# Patient Record
Sex: FEMALE | Race: WHITE | Employment: OTHER | URBAN - METROPOLITAN AREA
[De-identification: names, ages, dates, MRNs, and addresses within clinical notes are randomized per-mention and may not be internally consistent; named-entity substitution may affect disease eponyms.]

---

## 2017-11-01 ENCOUNTER — HOSPITAL ENCOUNTER (OUTPATIENT)
Dept: PHYSICAL THERAPY | Age: 72
Discharge: HOME | End: 2017-11-01
Payer: MEDICARE

## 2017-11-01 PROCEDURE — G8979 MOBILITY GOAL STATUS: HCPCS

## 2017-11-01 PROCEDURE — 97110 THERAPEUTIC EXERCISES: CPT

## 2017-11-01 PROCEDURE — 97162 PT EVAL MOD COMPLEX 30 MIN: CPT

## 2017-11-01 PROCEDURE — G8978 MOBILITY CURRENT STATUS: HCPCS

## 2017-11-01 NOTE — PROGRESS NOTES
[facility] PHYSICAL THERAPY AT [facility] [address]  Phone: [phone]  Fax: [phone] / [address] [address] PHYSICAL THERAPY SERVICES  Patient Name: Tomi Joseph : 1945   Treatment Diagnosis: Vertigo [R42] Onset Date: 17   Referral Source: Holly Tripathi MD Start of Care [facility]): 2017   Prior Hospitalization: n/a Provider #: [id number]   Prior Level of Function: Independent all ADL's   Comorbidities: R TKR (17), Osteoporosis, Arthritis, latex allergy   The Plan of Care and following information is based on the information from the initial evaluation.   ===========================================================================================  Assessment / key information:  Patient is 80 y.o. yo female who presents to [facility] with diagnosis of Vertigo [R42]. Patient reports she had a TKR on 17 when she fell and hit her head 2 days later. 10 days later on 10/9/17 the vertigo bagean. Patient c/o the room spinning with nausea but no vomiting, primarily with turning her head quickly to the left and lying flat. Patient reports she has increased symptoms when lying on her left side. Upon objective evaluation patient presents with increased symptoms specifically with supine to sit and sit to supine transitions, mild symptoms with cervical rotation left> R. No nystagmus noted with positional changes. .  Patient scored 64/100 on DHI indicating severe handicap. Positive symptom production with left Eply maneuver. Patient can benefit from PT interventions to decrease r/o fall, improve safety with ADLs, & decrease sx with ADLs & to improve overall functional status.     Eval Complexity: History HIGH Complexity :3+ comorbidities / personal factors will impact the outcome/ POC ;  Examination  HIGH Complexity : 4+ Standardized tests and measures addressing body structure, function, activity limitation and / or participation in recreation ; Presentation MEDIUM Complexity : Evolving with changing characteristics ; Decision Making Other outcome measures Dizziness Handicap 64/100  HIGH ; Overall Complexity MEDIUM    Problem List: decrease strength, impaired gait/ balance, decrease ADL/ functional abilitiies, decrease activity tolerance, decrease flexibility/ joint mobility and decrease transfer abilities   Treatment Plan may include any combination of the following: Therapeutic exercise, Therapeutic activities, Neuromuscular re-education, Physical agent/modality, Gait/balance training, Manual therapy and Patient education  Patient / Family readiness to learn indicated by: asking questions, trying to perform skills and interest  Persons(s) to be included in education: patient (P)  Barriers to Learning/Limitations: None  Measures taken:    Patient Goal (s): \"Get rid of vertigo\"   Patient self reported health status: good  Rehabilitation Potential: good   Short Term Goals: To be accomplished in  1  Week:  1) Patient to tolerate vestibular adaptation exercises tid. 2)Patient will report no symptoms of vertigo for at least 2 consecutive days     Long Term Goals: To be accomplished in  4 Weeks:  1) Patient to decrease score on Dizziness handicap by 34 points indicating improved safety and functional independence  2) Patient will demonstrate no vertigo with VOR exercises thus improving safety with ADL's  3) Patient to be independent with self treatment. Frequency / Duration:   Patient to be seen 1  times per week for 4 weeks:  Patient / Caregiver education and instruction: self care, activity modification and exercises  G-Codes (GP): Mobility [id number] Current  CL= 60-79%   Goal  CJ= 20-39%.   The severity rating is based on the Other [facility] AND SURGICAL [facility]    Therapist Signature: Mark Koo PT Date: 36/8/7266   Certification Period: 11/1/17 to 1/30/18 Time: 8:32 AM ===========================================================================================  I certify that the above Physical Therapy Services are being furnished while the patient is under my care. I agree with the treatment plan and certify that this therapy is necessary. Physician Signature:        Date:       Time:     Please sign and return to [facility] or you may fax the signed copy to [phone]. Thank you.

## 2017-11-01 NOTE — PROGRESS NOTES
PHYSICAL THERAPY - DAILY TREATMENT NOTE    Patient Name: Whit Moreno        Date: 2017  : 1945   YES Patient  Verified  Visit #:      12  Insurance: Payor: Akua Bowser / Plan: [place] & [place] / Product Type: Medicare /      In time: 1100 Out time: 1200   Total Treatment Time: 60     Medicare Time Tracking (below)   Total Timed Codes (min):  60 1:1 Treatment Time:  60     TREATMENT AREA =  Vertigo [R42]  SUBJECTIVE  Pain Level (on 0 to 10 scale):  0  / 10   Medication Changes/New allergies or changes in medical history, any new surgeries or procedures?     NO    If yes, update Summary List   Subjective Functional Status/Changes:  []  No changes reported     See POC          OBJECTIVE  Modalities Rationale:        min [] Estim, type/location:                                      []  att     []  unatt     []  w/US     []  w/ice    []  w/heat    min []  Mechanical Traction: type/lbs                   []  pro   []  sup   []  int   []  cont    []  before manual    []  after manual    min []  Ultrasound, settings/location:      min []  Iontophoresis w/ dexamethasone, location:                                               []  take home patch       []  in clinic    min []  Ice     []  Heat    location/position:     min []  Vasopneumatic Device, press/temp:     min []  Other:    [] Skin assessment post-treatment (if applicable):    []  intact    []  redness- no adverse reaction     []redness  adverse reaction:        30 min Therapeutic Exercise:  [x]  See flow sheet   Rationale:       decrease vertigo to improve the patients ability to perform ADL's      min Manual Therapy:         min Therapeutic Activity:    Rationale:        min Neuromuscular Re-ed:    Rationale:       min Gait Training:    Rationale:       min Patient Education:  YES  Reviewed HEP   [x]  Progressed/Changed HEP based on:   Issued written HEP and reviewed     Other Objective/Functional Measures:    Performed Epley maneuver for left side followed by 5 reps of harrington-daroff exercise   Post Treatment Pain Level (on 0 to 10) scale:   0  / 10     ASSESSMENT  Assessment/Changes in Function:     See POC    Reduction of nausea and vertigo following treatment session       []  See Progress Note/Recertification   Patient will continue to benefit from skilled PT services to modify and progress therapeutic interventions, address functional mobility deficits, address ROM deficits, address strength deficits, analyze and address soft tissue restrictions, analyze and cue movement patterns, analyze and modify body mechanics/ergonomics and assess and modify postural abnormalities to attain remaining goals.    Progress toward goals / Updated goals:    See POC     PLAN  [x]  Upgrade activities as tolerated YES Continue plan of care   []  Discharge due to :    []  Other:      Therapist: Marialuisa Tucker PT    Date: 11/1/2017 Time: 8:31 AM     Future Appointments  Date Time Provider Marita Campbell   11/1/2017 11:00 AM Marialuisa Tucker PT REHAB CENTER AT [facility]

## 2017-11-08 ENCOUNTER — HOSPITAL ENCOUNTER (OUTPATIENT)
Dept: PHYSICAL THERAPY | Age: 72
Discharge: HOME | End: 2017-11-08
Payer: MEDICARE

## 2017-11-08 PROCEDURE — 97112 NEUROMUSCULAR REEDUCATION: CPT

## 2017-11-08 NOTE — PROGRESS NOTES
PHYSICAL THERAPY - DAILY TREATMENT NOTE    Patient Name: Rita Henderson        Date: 2017  : 1945   YES Patient  Verified  Visit #:   1   of   4  Insurance: Payor: Oneil Dear / Plan: [place] MEDICARE PART A & B / Product Type: Medicare /      In time: 1100 Out time: 1150   Total Treatment Time: 50     Medicare Time Tracking (below)   Total Timed Codes (min):  50 1:1 Treatment Time:  50     TREATMENT AREA =  Other abnormalities of gait and mobility [R26.89]  Vertigo [R42]    SUBJECTIVE  Pain Level (on 0 to 10 scale):  0  / 10   Medication Changes/New allergies or changes in medical history, any new surgeries or procedures? NO    If yes, update Summary List   Subjective Functional Status/Changes:  []  No changes reported     Patient reports significant improvement over the past week. \"I was really nauseated and had increased vertigo for three days following the last treatment session but then it improved significantly. Some days I didn't even feel dizzy.   My nausea is at about a 1 right now (compared to 3 last week)\"          OBJECTIVE  Modalities Rationale:      min [] Estim, type/location:                                      []  att     []  unatt     []  w/US     []  w/ice    []  w/heat    min []  Mechanical Traction: type/lbs                   []  pro   []  sup   []  int   []  cont    []  before manual    []  after manual    min []  Ultrasound, settings/location:      min []  Iontophoresis w/ dexamethasone, location:                                               []  take home patch       []  in clinic    min []  Ice     []  Heat    location/position:     min []  Vasopneumatic Device, press/temp:     min []  Other:    [] Skin assessment post-treatment (if applicable):    []  intact    []  redness- no adverse reaction     []redness  adverse reaction:         min Therapeutic Exercise:  [x]  See flow sheet        min Manual Therapy:         min Therapeutic Activity:        50 min Neuromuscular Re-ed: Eply maneuver x1, see  FS for new exercises   Rationale:    improve balance and decrease vertigo to improve the patients ability to perform all ADL's without LOB or nausea     min Gait Training:    Rationale:       min Patient Education:  Marixa Hull   []  Progressed/Changed HEP based on: Other Objective/Functional Measures: Added VSE exercises to HEP and FS     Post Treatment Pain Level (on 0 to 10) scale:   0  / 10     ASSESSMENT  Assessment/Changes in Function:     Patient continued to have mild reproduction of vertigo with L s/l to sit minimal recovery time required and vanishing by the 5th repetition. Eply maneuver produced symptoms then improved vestibular response noted with harrington-dorff ex following maneuver. Patient had slight increase in nausea with VSE horizontal which improved/resolved with VSE vertical.  Nausea following treatment session decreased to < 1.     []  See Progress Note/Recertification   Patient will continue to benefit from skilled PT services to modify and progress therapeutic interventions, address functional mobility deficits, analyze and cue movement patterns, analyze and modify body mechanics/ergonomics, assess and modify postural abnormalities and address imbalance/dizziness to attain remaining goals. Progress toward goals / Updated goals:    STG #1 and LTG #1 met.   Progressing well towards all other goals     PLAN  [x]  Upgrade activities as tolerated YES Continue plan of care   []  Discharge due to :    []  Other:      Therapist: Vero Stover PT    Date: 11/8/2017 Time: 8:50 AM     Future Appointments  Date Time Provider Marita Campbell   11/8/2017 11:00 AM Vero Stover, PT REHAB CENTER AT [facility]

## 2017-11-15 ENCOUNTER — APPOINTMENT (OUTPATIENT)
Dept: PHYSICAL THERAPY | Age: 72
End: 2017-11-15
Payer: MEDICARE

## 2017-11-16 ENCOUNTER — HOSPITAL ENCOUNTER (OUTPATIENT)
Dept: PHYSICAL THERAPY | Age: 72
Discharge: HOME | End: 2017-11-16
Payer: MEDICARE

## 2017-11-16 PROCEDURE — G8979 MOBILITY GOAL STATUS: HCPCS

## 2017-11-16 PROCEDURE — G8980 MOBILITY D/C STATUS: HCPCS

## 2017-11-16 PROCEDURE — 97112 NEUROMUSCULAR REEDUCATION: CPT

## 2017-11-16 PROCEDURE — 95992 CANALITH REPOSITIONING PROC: CPT

## 2017-11-16 NOTE — PROGRESS NOTES
PHYSICAL THERAPY - DAILY TREATMENT NOTE    Patient Name: Michelle Hayes        Date: 2017  : 1945   YES Patient  Verified  Visit #:   2   of   4  Insurance: Payor: [address] [place] [address] / Plan: [place] MEDICARE PART A & B / Product Type: Medicare /      In time: 800 Out time: 039   Total Treatment Time: 30     Medicare Time Tracking (below)   Total Timed Codes (min):  25 1:1 Treatment Time:  25     TREATMENT AREA = Other abnormalities of gait and mobility [R26.89]  Vertigo [R42]    SUBJECTIVE  Pain Level (on 0 to 10 scale):  0  / 10   Medication Changes/New allergies or changes in medical history, any new surgeries or procedures? NO    If yes, update Summary List   Subjective Functional Status/Changes:  []  No changes reported     Got episode of dizziness last Friday while driving. Room spinning and then head pulsating       OBJECTIVE        15 min Manual Therapy: Technique:      [] STM[]IASTM[]TPR[]PROM[] Stretching  [] SOR[] man traction[x] L Epley x2 (CRM)  []OP with REIL  []Jt manipulation []Gr I [] II []  III [] IV[] V[]    Treatment Area:  vestib   Rationale:      dec dizziness to improve patient's ability to perform ADLs    10 min Neuromuscular Re-ed: []  Seebelow- vestibular testing   Rationale:    dec dizziness to improve the patients ability to perform ADLs          throughout Rx min Patient Education:  YES  Reviewed HEP   []  Progressed/Changed HEP based on: Other Objective/Functional Measures:    Modified [place]- Hallpike : positive, bilat L>R, neg L after repositioning     Post Treatment Pain Level (on 0 to 10) scale:   0  / 10     ASSESSMENT  Assessment/Changes in Function:     Functional improvement:  Less dizziness with turning head        []  See Progress Note/Recertification   Patient will continue to benefit from skilled PT services to modify and progress therapeutic interventions and address imbalance/dizziness to attain remaining goals.    Progress toward goals / Updated goals:    Patient reporting significant improvement in Sx     PLAN  []  Upgrade activities as tolerated YES Continue plan of care   []  Discharge due to :    []  Other:      Therapist: Carole Ahumada, PT    Date: 11/16/2017 Time: 8:04 AM     No future appointments.

## 2017-11-30 ENCOUNTER — HOSPITAL ENCOUNTER (OUTPATIENT)
Dept: PHYSICAL THERAPY | Age: 72
Discharge: HOME | End: 2017-11-30
Payer: MEDICARE

## 2017-11-30 PROCEDURE — 95992 CANALITH REPOSITIONING PROC: CPT

## 2017-11-30 PROCEDURE — 97112 NEUROMUSCULAR REEDUCATION: CPT

## 2017-11-30 NOTE — PROGRESS NOTES
PHYSICAL THERAPY - DAILY TREATMENT NOTE    Patient Name: Gloria Barry        Date: 2017  : 1945   YES Patient  Verified  Visit #:   3   of   4  Insurance: Payor: Meredith Hartman / Plan: [place] MEDICARE PART A & B / Product Type: Medicare /      In time: 36 Out time: 45   Total Treatment Time: 25     Medicare Time Tracking (below)   Total Timed Codes (min):  25 1:1 Treatment Time:  25     TREATMENT AREA = Other abnormalities of gait and mobility [R26.89]  Vertigo [R42]    SUBJECTIVE  Pain Level (on 0 to 10 scale):  0  / 10   Medication Changes/New allergies or changes in medical history, any new surgeries or procedures? NO    If yes, update Summary List   Subjective Functional Status/Changes:  []  No changes reported     Did well after last visit. Minor symptoms until recently. Now with minor dizziness and nausea. Overall- 90-95% better     OBJECTIVE        15 min Manual Therapy: Technique:      [] STM[]IASTM[]TPR[]PROM[] Stretching  [] SOR[] man traction[x] CRMx2  []OP with REIL  []Jt manipulation []Gr I [] II []  III [] IV[] V[]    Treatment Area:  vestibular   Rationale:      dec dizziness to improve patient's ability to perform ADLs/ drive    10 min Neuromuscular Re-ed: [x]  Seebelow   Rationale:    dec dizziness to improve the patients ability to perform ADLs/ drive        throughout Rx min Patient Education:  YES  Reviewed HEP   []  Progressed/Changed HEP based on: Other Objective/Functional Measures:    [place]- Hallpike:R- neg, L- mild dizziness  Roll test:neg bilat     Post Treatment Pain Level (on 0 to 10) scale:   0  / 10     ASSESSMENT  Assessment/Changes in Function:     Functional improvement:  driving        []  See Progress Note/Recertification   Patient will continue to benefit from skilled PT services to address imbalance/dizziness to attain remaining goals.    Progress toward goals / Updated goals:         PLAN  []  Upgrade activities as tolerated YES Continue plan of care   []  Discharge due to :    []  Other:      Therapist: Beverley Luna PT    Date: 11/30/2017 Time: 8:19 AM     Future Appointments  Date Time Provider Marita Campbell   11/30/2017 8:30 AM Beverley Luna PT REHAB CENTER AT [facility]

## 2018-03-06 NOTE — PROGRESS NOTES
[address] [facility] PHYSICAL THERAPY AT [facility]  [address], [address], [place], [address]   phone: [phone], fax: [phone]    DISCHARGE SUMMARY  Patient Name: Windy Simon : 1945   Treatment/Medical Diagnosis: Other abnormalities of gait and mobility [R26.89]  Vertigo [R42]   Referral Source: Dexter Olivera MD     Date of Initial Visit: 17 Attended Visits: 3 Missed Visits: 1     SUMMARY OF TREATMENT  PT consisted of neuro re-education, manual CRM  [address] has been seen for PT treatment a total of 3 times. When last seen, she reports 95% overall improvement. At this point, she has not returned for treatment as per POC. Formal assessment towards LTGs not completed. Plan:   Discharge from Physical Therapy    GCODES: Mobility   Goal  CJ= 20-39%  D/C  CI= 1-19%. The severity rating is based on the Other patient subjective report    If you have any questions/comments please contact us directly at [phone]. Thank you for allowing us to assist in the care of your patient.     Therapist Signature: Alondra Means, PT Date: 3/6/18     Time: 11:47 AM